# Patient Record
(demographics unavailable — no encounter records)

---

## 2024-12-24 NOTE — HISTORY OF PRESENT ILLNESS
[de-identified] : eye discharge [FreeTextEntry6] :  Pt has had congestion x 5d. + purulent nasal d/c. Today has eye d/c. Tm 100  Is sleep OK

## 2024-12-24 NOTE — HISTORY OF PRESENT ILLNESS
[de-identified] : eye discharge [FreeTextEntry6] :  Pt has had congestion x 5d. + purulent nasal d/c. Today has eye d/c. Tm 100  Is sleep OK

## 2025-01-09 NOTE — HISTORY OF PRESENT ILLNESS
[de-identified] : eye discharge [FreeTextEntry6] : Patient is a 19-month-old female brought to office by her dad for cold cough and congestion for several days.  Patient was treated several weeks ago for sinus infection.  She received 10 days of antibiotics.  Currently patient has no fever no vomiting no diarrhea.  Patient is eating and drinking well.  Dad noticed significant eye discharge on the right side this morning.  Patient attends  with many ill contact

## 2025-01-09 NOTE — DISCUSSION/SUMMARY
[FreeTextEntry1] : Discussed upper respiratory tract infection and conjunctivitis at length with father.  Start eyedrops today. Keep eyes clean and dry. Call immediately if any worsening of signs or symptoms. Parent understands the plan.

## 2025-01-10 NOTE — PHYSICAL EXAM
[Alert] : alert [No Acute Distress] : no acute distress [Normocephalic] : normocephalic [Anterior [place] Closed] : anterior fontanelle closed [Red Reflex Bilateral] : red reflex bilateral [PERRL] : PERRL [Normally Placed Ears] : normally placed ears [Auricles Well Formed] : auricles well formed [Clear Tympanic membranes with present light reflex and bony landmarks] : clear tympanic membranes with present light reflex and bony landmarks [Nares Patent] : nares patent [Palate Intact] : palate intact [Uvula Midline] : uvula midline [Tooth Eruption] : tooth eruption  [Supple, full passive range of motion] : supple, full passive range of motion [No Palpable Masses] : no palpable masses [Symmetric Chest Rise] : symmetric chest rise [Clear to Auscultation Bilaterally] : clear to auscultation bilaterally [Regular Rate and Rhythm] : regular rate and rhythm [S1, S2 present] : S1, S2 present [No Murmurs] : no murmurs [+2 Femoral Pulses] : +2 femoral pulses [Soft] : soft [NonTender] : non tender [Non Distended] : non distended [Normoactive Bowel Sounds] : normoactive bowel sounds [No Hepatomegaly] : no hepatomegaly [No Splenomegaly] : no splenomegaly [Joshua 1] : Joshua 1 [No Clitoromegaly] : no clitoromegaly [Normal Vaginal Introitus] : normal vaginal introitus [Patent] : patent [Normally Placed] : normally placed [No Abnormal Lymph Nodes Palpated] : no abnormal lymph nodes palpated [No Clavicular Crepitus] : no clavicular crepitus [Symmetric Buttocks Creases] : symmetric buttocks creases [No Spinal Dimple] : no spinal dimple [NoTuft of Hair] : no tuft of hair [Cranial Nerves Grossly Intact] : cranial nerves grossly intact [No Rash or Lesions] : no rash or lesions [FreeTextEntry4] : nasal congestion, mucoid nasal discharge [de-identified] : small eccymosis and swelling to middle lower lip

## 2025-01-10 NOTE — HISTORY OF PRESENT ILLNESS
[Mother] : mother [Cow's milk (Ounces per day ___)] : consumes [unfilled] oz of Cow's milk per day [Fruit] : fruit [Vegetables] : vegetables [Meat] : meat [Cereal] : cereal [Eggs] : eggs [Baby food] : baby food [Finger Foods] : finger foods [Table food] : table food [Normal] : Normal [Sippy cup use] : Sippy cup use [Brushing teeth] : Brushing teeth [Yes] : Patient goes to dentist yearly [Vitamin] : Primary Fluoride Source: Vitamin [Playtime] : Playtime  [Temper Tantrums] : Temper Tantrums [No] : No cigarette smoke exposure [Water heater temperature set at <120 degrees F] : Water heater temperature set at <120 degrees F [Car seat in back seat] : Car seat in back seat [Carbon Monoxide Detectors] : Carbon monoxide detectors [Smoke Detectors] : Smoke detectors [Up to date] : Up to date [FreeTextEntry7] : Doing well [FreeTextEntry1] : 18 month old girl here for routine PE. Doing well. No current concerns. Walks, runs, many words, understands all.  Growth and development wnl.  Good po/uop/bm. Normal sleep and activity. S/P fall this am, small bruise and swelling to lower lip. Seen yesterday for URI/conjunctivitis. Eyes are much improved today.

## 2025-01-10 NOTE — DISCUSSION/SUMMARY
[Normal Growth] : growth [Normal Development] : development [None] : No known medical problems [No Elimination Concerns] : elimination [No Feeding Concerns] : feeding [No Skin Concerns] : skin [Normal Sleep Pattern] : sleep [Family Support] : family support [Child Development and Behavior] : child development and behavior [Language Promotion/Hearing] : language promotion/hearing [Toliet Training Readiness] : toliet training readiness [Safety] : safety [No Medications] : ~He/She~ is not on any medications [Parent/Guardian] : parent/guardian [] : The components of the vaccine(s) to be administered today are listed in the plan of care. The disease(s) for which the vaccine(s) are intended to prevent and the risks have been discussed with the caretaker.  The risks are also included in the appropriate vaccination information statements which have been provided to the patient's caregiver.  The caregiver has given consent to vaccinate. [FreeTextEntry1] : Anticipatory guidance and parent education given. Continue whole cow's milk. Continue table foods, 3 meals with 2-3 snacks per day.  Importance of well-balanced, healthy diet discussed. Incorporate water daily in a sippy cup. Brush teeth twice a day with soft toothbrush. Recommend visit to dentist. When in car, keep child in rear-facing car seats until age 2, or until the maximum height and weight for seat is reached. Put toddler to sleep in own bed or crib. Help toddler to maintain consistent daily routines and sleep schedule. Toilet training discussed. Recognize anxiety in new settings. Ensure home is safe. Be within arm's reach of toddler at all times. Use consistent, positive discipline. Read aloud to toddler. DTaP, Hib vaccines given. Follow up in 6 months for PE.

## 2025-05-13 NOTE — DISCUSSION/SUMMARY
[FreeTextEntry1] : Anticipatory guidance and parent education given.  ? lyme- labs to be done ASAP, parents will take patient from office to lab  Treatment for lyme discussed. hx of amox rash, mother aware and will monitor for symptoms. amox to be started for likely lyme ER for concerns  Follow up as needed for persistent or worsening symptoms, questions and concerns.

## 2025-05-13 NOTE — HISTORY OF PRESENT ILLNESS
[de-identified] : rash [FreeTextEntry6] : BIB parents for rash.  Mother reports she noticed a small red hive like patch on patients upper R shoulder last night. when patient woke up this morning parents noticed more red blotches on bilateral arms, shoulders and torso.  patient does not seem to know they are there. no new exposures.  parents report they removed a tick from nape of patient's neck ~5 weeks ago.  No fever. No difficulty breathing, cough, congestion. No v/d. No fatigue. Good po/uop/bm. Normal sleep and activity.

## 2025-05-13 NOTE — PHYSICAL EXAM
[NL] : moves all extremities x4, warm, well perfused x4 [de-identified] : erythematous wheels scattered to bilateral arms, torso and bilateral shoulders. L shoulder with erythema migrans

## 2025-05-14 NOTE — DISCUSSION/SUMMARY
[FreeTextEntry1] : Anticipatory guidance and parent education given. May give Claritin 5mgg daily as needed for hives/itchiness. Supportive care. Suspect viral etiology for rash. May discontinue Amoxicillin. Lyme test in negative and rash is not c/w ECM. Follow up as needed for persistent or worsening symptoms. Parent understands and agrees with plan.

## 2025-05-14 NOTE — HISTORY OF PRESENT ILLNESS
[de-identified] : rash [FreeTextEntry6] : 23 month old girl BIB parents for follow up of rash that started yesterday. Seen in office yesterday and started on Amoxicillin for suspected Lyme disease. Subsequent Lyme testing was negative. Rash has been coming and going, appearing in different spots, raised and red. Pt is not itchy or uncomfortable. Pt with slight runny nose, otherwise well. No new foods or contacts. No vomiting or diarrhea. Good po/uop/bm. Normal sleep and activity.

## 2025-06-13 NOTE — PHYSICAL EXAM

## 2025-06-13 NOTE — DISCUSSION/SUMMARY
[Normal Growth] : growth [Normal Development] : development [None] : No known medical problems [No Elimination Concerns] : elimination [No Feeding Concerns] : feeding [No Skin Concerns] : skin [Normal Sleep Pattern] : sleep [Assessment of Language Development] : assessment of language development [Temperament and Behavior] : temperament and behavior [Toilet Training] : toilet training [TV Viewing] : tv viewing [Safety] : safety [No Medications] : ~He/She~ is not on any medications [Parent/Guardian] : parent/guardian [] : The components of the vaccine(s) to be administered today are listed in the plan of care. The disease(s) for which the vaccine(s) are intended to prevent and the risks have been discussed with the caretaker.  The risks are also included in the appropriate vaccination information statements which have been provided to the patient's caregiver.  The caregiver has given consent to vaccinate. [FreeTextEntry1] : Anticipatory guidance and parent education given. Continue cow's milk. Continue table foods, 3 meals with 2-3 snacks per day. Incorporate water daily in a sippy cup. Brush teeth twice a day with soft toothbrush. Recommend visit to dentist. Fluoride daily. When in car, keep child in rear-facing car seats until age 2, or until  the maximum height and weight for seat is reached. Put toddler to sleep in own bed. Help toddler to maintain consistent daily routines and sleep schedule. Toilet training discussed. Ensure home is safe. Use consistent, positive discipline. Read aloud to toddler. Limit screen time to no more than 2 hours per day. CBC, Lead, Ferritin wnl 5/2025. Hepatitis A vaccine given. Monitor in toeing/gait. Orthopedics f/u prn. Return in 6 months for PE.

## 2025-06-13 NOTE — HISTORY OF PRESENT ILLNESS
[Mother] : mother [Normal] : Normal [No] : No cigarette smoke exposure [Water heater temperature set at <120 degrees F] : Water heater temperature set at <120 degrees F [Car seat in back seat] : Car seat in back seat [Smoke Detectors] : Smoke detectors [Carbon Monoxide Detectors] : Carbon monoxide detectors [Fruit] : fruit [Vegetables] : vegetables [Meat] : meat [Cereal] : cereal [Eggs] : eggs [Finger Foods] : finger foods [Table food] : table food [Dairy] : dairy [Sippy cup use] : Sippy cup use [Brushing teeth] : Brushing teeth [Yes] : Patient goes to dentist yearly [Vitamin] : Primary Fluoride Source: Vitamin [Playtime 60 min a day] : Playtime 60 min a day [Temper Tantrums] : Temper Tantrums [Toilet Training] : Toilet training [<2 hrs of screen time] : Less than 2 hrs of screen time [Up to date] : Up to date [At risk for exposure to TB] : Not at risk for exposure to Tuberculosis [FreeTextEntry7] : Doing well [de-identified] : feet turning in [FreeTextEntry1] : 2 year old girl here for routine PE. Doing well.  Good po/uop/bm. Normal sleep and activity. Many words, short phrases, understands all. Jumps, climbs stairs, pretend play. Growth and development wnl. Pt with in toeing of both feet. Runs well, no limp.